# Patient Record
Sex: MALE | Race: WHITE | NOT HISPANIC OR LATINO | Employment: OTHER | URBAN - METROPOLITAN AREA
[De-identification: names, ages, dates, MRNs, and addresses within clinical notes are randomized per-mention and may not be internally consistent; named-entity substitution may affect disease eponyms.]

---

## 2023-02-10 ENCOUNTER — HOSPITAL ENCOUNTER (EMERGENCY)
Facility: HOSPITAL | Age: 48
Discharge: HOME | End: 2023-02-10
Attending: EMERGENCY MEDICINE

## 2023-02-10 VITALS
RESPIRATION RATE: 16 BRPM | TEMPERATURE: 98.4 F | HEIGHT: 72 IN | HEART RATE: 94 BPM | SYSTOLIC BLOOD PRESSURE: 175 MMHG | DIASTOLIC BLOOD PRESSURE: 73 MMHG | WEIGHT: 256 LBS | BODY MASS INDEX: 34.67 KG/M2 | OXYGEN SATURATION: 97 %

## 2023-02-10 DIAGNOSIS — Z51.89 VISIT FOR WOUND CHECK: Primary | ICD-10-CM

## 2023-02-10 NOTE — ED ATTENDING ATTESTATION
2/10/2023  Heena LUONG DO, saw and evaluated the patient  I have discussed the patient with the resident/non-physician practitioner and agree with the resident's/non-physician practitioner's findings, Plan of Care, and MDM as documented in the resident's/non-physician practitioner's note, except where noted  All available labs and Radiology studies were reviewed  I was present for key portions of any procedure(s) performed by the resident/non-physician practitioner and I was immediately available to provide assistance  At this point I agree with the current assessment done in the Emergency Department  I have conducted an independent evaluation of this patient a history and physical is as follows:    48M presents for wound eval    4 weeks ago he had a surgery done to clean out wound from a spider bite - surgery was preformed in [place]  Patient had a wound vac, but wound vac fell off  No F/C, no tracking redness, no other concerning symptoms  Patient has wound treatment, however wound VAC unable to be placed back on at this facility at this time  Patient is advised return to outside hospital that performed the procedure  Patient advised to return to emergency department if worsening signs of infection      ED Course         Critical Care Time  Procedures

## 2023-02-10 NOTE — ED NOTES
Redressed wound with non-adherent strips, telfa and abd pads        [facility]  02/10/23 2741

## 2023-02-10 NOTE — ED PROVIDER NOTES
History  Chief Complaint   Patient presents with   • Wound Check     Pt states had groin wound from surgery, done in [place] 4 weeks ago  Pt states wound vac fell off about an hour ago  Pt put gauze on it, drainage was serosanguinous  Pt was told to come to the ED to replace  Patient 41-year-old male who presents to the ER for wound VAC replacement  Patient had surgery and groin due to an infection secondary to a spider bite  His surgery was done in [place] and he had a wound VAC placed  Today the wound VAC fell off  He called his surgeon office and was told to come to the ER to replace wound VAC  Patient has a wound VAC for replacement  He denies fever or chills  None       Past Medical History:   Diagnosis Date   • Diabetes mellitus ([facility] [address] )    • Spider bite        History reviewed  No pertinent surgical history  History reviewed  No pertinent family history  I have reviewed and agree with the history as documented  E-Cigarette/Vaping   • E-Cigarette Use Never User      E-Cigarette/Vaping Substances   • Nicotine No    • THC No    • CBD No    • Flavoring No    • Other No    • Unknown No      Social History     Tobacco Use   • Smoking status: Some Days     Types: Cigarettes   • Smokeless tobacco: Never   Vaping Use   • Vaping Use: Never used   Substance Use Topics   • Alcohol use: Never   • Drug use: Never       Review of Systems   Constitutional: Negative for chills, diaphoresis and fever  Skin: Positive for wound  Negative for rash  Physical Exam  Physical Exam  Constitutional:       General: He is not in acute distress  Skin:     Comments: Wound covered in gauze and tape over right groin area  Neurological:      Mental Status: He is alert and oriented to person, place, and time     Psychiatric:         Mood and Affect: Mood normal          Behavior: Behavior normal          Vital Signs  ED Triage Vitals [02/10/23 1609]   Temperature Pulse Respirations Blood Pressure SpO2 98 4 °F (36 9 °C) 94 16 (!) 175/73 97 %      Temp Source Heart Rate Source Patient Position - Orthostatic VS BP Location FiO2 (%)   Oral Monitor Sitting Left arm --      Pain Score       10 - Worst Possible Pain           Vitals:    02/10/23 1609   BP: (!) 175/73   Pulse: 94   Patient Position - Orthostatic VS: Sitting         Visual Acuity      ED Medications  Medications - No data to display    Diagnostic Studies  Results Reviewed     None                 No orders to display              Procedures  Procedures         ED Course                                             Medical Decision Making  Patient is a 41-year-old male who presents for wound VAC replacement  Patient was made aware that we are unable to replace the wound VAC and no one is available from general surgery  It would be best for him to see his surgery office to replace wound VAC  He will be able to get to the office on Monday  We will replace the dressings  If dressings come off, fever, chills or other new symptoms, patient return to the ER  Disposition  Final diagnoses:   Visit for wound check     Time reflects when diagnosis was documented in both MDM as applicable and the Disposition within this note     Time User Action Codes Description Comment    2/10/2023  6:31 PM Fran Lowry Add [Z51 89] Visit for wound check       ED Disposition     ED Disposition   Discharge    Condition   Stable    Date/Time   Fri Feb 10, 2023  6:40 PM    Comment   Colleen Montez discharge to home/self care                 Follow-up Information     Follow up With Specialties Details Why Contact Info Additional [address] [facility] Emergency Department Emergency Medicine Go to  If symptoms worsen [address] [place] [id number] [facility] Emergency Department, [address] [place] [address], [facility]          Patient's Medications    No medications on file       No discharge procedures on file      PDMP Review     None          ED Provider  Electronically Signed by           Konstantin Botello PA-C  02/10/23 8900

## 2023-03-08 ENCOUNTER — OFFICE (OUTPATIENT)
Dept: URBAN - METROPOLITAN AREA CLINIC 76 | Facility: CLINIC | Age: 48
Setting detail: OPHTHALMOLOGY
End: 2023-03-08
Payer: COMMERCIAL

## 2023-03-08 DIAGNOSIS — E11.3293: ICD-10-CM

## 2023-03-08 DIAGNOSIS — E11.9: ICD-10-CM

## 2023-03-08 DIAGNOSIS — H31.002: ICD-10-CM

## 2023-03-08 DIAGNOSIS — H52.4: ICD-10-CM

## 2023-03-08 PROCEDURE — 92004 COMPRE OPH EXAM NEW PT 1/>: CPT | Performed by: OPHTHALMOLOGY

## 2023-03-08 PROCEDURE — 92250 FUNDUS PHOTOGRAPHY W/I&R: CPT | Performed by: OPHTHALMOLOGY

## 2023-03-08 PROCEDURE — 92015 DETERMINE REFRACTIVE STATE: CPT | Performed by: OPHTHALMOLOGY

## 2023-03-08 ASSESSMENT — KERATOMETRY
OS_K2POWER_DIOPTERS: 43.75
OS_K1POWER_DIOPTERS: 43.00
OD_K1POWER_DIOPTERS: 42.75
OS_AXISANGLE_DEGREES: 129
OD_K2POWER_DIOPTERS: 43.75
OD_AXISANGLE_DEGREES: 34

## 2023-03-08 ASSESSMENT — REFRACTION_AUTOREFRACTION
OD_AXIS: 117
OD_SPHERE: +0.50
OD_CYLINDER: -0.75
OS_CYLINDER: -1.00
OS_SPHERE: +1.00
OS_AXIS: 81

## 2023-03-08 ASSESSMENT — TONOMETRY
OD_IOP_MMHG: 16
OS_IOP_MMHG: 17

## 2023-03-08 ASSESSMENT — CONFRONTATIONAL VISUAL FIELD TEST (CVF)
OD_FINDINGS: FULL
OS_FINDINGS: FULL

## 2023-03-08 ASSESSMENT — VISUAL ACUITY
OD_BCVA: 20/20-2
OS_BCVA: 20/25-2

## 2023-03-08 ASSESSMENT — AXIALLENGTH_DERIVED
OS_AL: 23.4439
OD_AL: 23.6351

## 2023-03-08 ASSESSMENT — REFRACTION_MANIFEST
OD_AXIS: 110
OD_CYLINDER: -0.25
OS_VA1: 20/20-
OS_SPHERE: PLANO
OS_ADD: +1.25
OD_ADD: +1.25
OD_SPHERE: PLANO
OD_VA1: 20/20-
OS_CYLINDER: SPH

## 2023-03-08 ASSESSMENT — SPHEQUIV_DERIVED
OD_SPHEQUIV: 0.125
OS_SPHEQUIV: 0.5